# Patient Record
Sex: FEMALE | Race: WHITE | NOT HISPANIC OR LATINO | Employment: UNEMPLOYED | URBAN - METROPOLITAN AREA
[De-identification: names, ages, dates, MRNs, and addresses within clinical notes are randomized per-mention and may not be internally consistent; named-entity substitution may affect disease eponyms.]

---

## 2019-12-30 ENCOUNTER — HOSPITAL ENCOUNTER (EMERGENCY)
Facility: MEDICAL CENTER | Age: 24
End: 2019-12-31
Attending: EMERGENCY MEDICINE

## 2019-12-30 DIAGNOSIS — J02.9 VIRAL PHARYNGITIS: ICD-10-CM

## 2019-12-30 PROCEDURE — A9270 NON-COVERED ITEM OR SERVICE: HCPCS | Performed by: EMERGENCY MEDICINE

## 2019-12-30 PROCEDURE — 99284 EMERGENCY DEPT VISIT MOD MDM: CPT

## 2019-12-30 PROCEDURE — 700102 HCHG RX REV CODE 250 W/ 637 OVERRIDE(OP): Performed by: EMERGENCY MEDICINE

## 2019-12-30 PROCEDURE — 87651 STREP A DNA AMP PROBE: CPT

## 2019-12-30 RX ORDER — IBUPROFEN 600 MG/1
600 TABLET ORAL ONCE
Status: COMPLETED | OUTPATIENT
Start: 2019-12-30 | End: 2019-12-30

## 2019-12-30 RX ADMIN — IBUPROFEN 600 MG: 600 TABLET ORAL at 22:51

## 2019-12-30 SDOH — HEALTH STABILITY: MENTAL HEALTH: HOW OFTEN DO YOU HAVE A DRINK CONTAINING ALCOHOL?: MONTHLY OR LESS

## 2019-12-30 ASSESSMENT — LIFESTYLE VARIABLES
TOTAL SCORE: 0
HAVE PEOPLE ANNOYED YOU BY CRITICIZING YOUR DRINKING: NO
TOTAL SCORE: 0
HAVE YOU EVER FELT YOU SHOULD CUT DOWN ON YOUR DRINKING: NO
EVER FELT BAD OR GUILTY ABOUT YOUR DRINKING: NO
CONSUMPTION TOTAL: INCOMPLETE
DO YOU DRINK ALCOHOL: YES
EVER HAD A DRINK FIRST THING IN THE MORNING TO STEADY YOUR NERVES TO GET RID OF A HANGOVER: NO
TOTAL SCORE: 0

## 2019-12-31 VITALS
WEIGHT: 293 LBS | HEIGHT: 67 IN | RESPIRATION RATE: 18 BRPM | OXYGEN SATURATION: 95 % | TEMPERATURE: 97.3 F | BODY MASS INDEX: 45.99 KG/M2 | HEART RATE: 72 BPM | SYSTOLIC BLOOD PRESSURE: 145 MMHG | DIASTOLIC BLOOD PRESSURE: 89 MMHG

## 2019-12-31 LAB — S PYO DNA SPEC NAA+PROBE: NOT DETECTED

## 2019-12-31 NOTE — ED PROVIDER NOTES
"ED Provider Note    Scribed for Too Hebert M.D. by Mer Smalls. 12/30/2019, 10:24 PM.    Primary care provider: No primary care provider  Means of arrival: walkin  History obtained from: patient  History limited by: none    CHIEF COMPLAINT  Chief Complaint   Patient presents with   • Sore Throat     x 4 days       HPI  Charlene Bernardo is a 24 y.o. female who presents to the Emergency Department for sore throat onset five days ago. This has been worsening since then, now with exudate. Patient remarks that there are white patches to back of throat. Also reports difficulty catching breath and headache. No fever or abdominal pain. Still has tonsils. No known drug allergies    REVIEW OF SYSTEMS  Pertinent positives include sore throat, exudates, difficulty catching breath, headache.   Pertinent negatives include fever, abdominal pain.     PAST MEDICAL HISTORY  History reviewed. No pertinent medical history.    SURGICAL HISTORY  History reviewed. No pertinent surgical history.    SOCIAL HISTORY  Social History     Tobacco Use   • Smoking status: Never Smoker   • Smokeless tobacco: Never Used   Substance Use Topics   • Alcohol use: Yes     Frequency: Monthly or less   • Drug use: Yes     Comment: THC.      Social History     Substance and Sexual Activity   Drug Use Yes    Comment: THC.       FAMILY HISTORY  History reviewed. No pertinent family history.    CURRENT MEDICATIONS  No known drug allergies    ALLERGIES  No Known Allergies    PHYSICAL EXAM  VITAL SIGNS: BP (!) 176/128   Pulse (!) 118   Temp 36.3 °C (97.3 °F) (Oral)   Resp 20   Ht 1.702 m (5' 7\")   Wt (!) 144 kg (317 lb 7.4 oz)   LMP 12/08/2019   SpO2 97%   BMI 49.72 kg/m²     Constitutional: Well developed, Well nourished, no distress.   HENT: Normocephalic, Atraumatic, normal TM's bilaterally. Oropharynx moist, enlarged tonsils with erythema and exudates, not touching uvula, mild lymphadenopathy.   Eyes: Conjunctiva normal, No " discharge.   Neck: Supple, No stridor, mild lymphadenopathy.   Cardiovascular: Normal heart rate, Normal rhythm, No murmurs, equal pulses.   Pulmonary: Normal breath sounds, No respiratory distress, No wheezing, No rales, No rhonchi.  Chest: No chest wall tenderness or deformity.   Abdomen:Soft, No tenderness, No masses, no rebound, no guarding. No splenomegaly, no left upper quadrant tenderness   Musculoskeletal: No major deformities noted, No tenderness.   Skin: Warm, Dry, No erythema, No rash.   Neurologic: Alert & oriented x 3, Normal motor function,  No focal deficits noted.   Psychiatric: Affect normal, Judgment normal, Mood normal.     DIAGNOSTIC STUDIES/PROCEDURES  LABS  Results for orders placed or performed during the hospital encounter of 12/30/19   Group A Strep by PCR   Result Value Ref Range    Group A Strep by PCR Not Detected Not Detected     All labs reviewed by me.    COURSE & MEDICAL DECISION MAKING  Pertinent Labs & Imaging studies reviewed. (See chart for details)    10:24 PM - Patient seen and examined at bedside. Patient will be treated with 600mg motrin. Ordered group A strep to evaluate her symptoms.     12:45 AM Patient reevaluated at bedside. Patient feeling improved, is resting comfortably, and is in no acute distress. Discussed resulted studies. Discussed plan for discharge; I advised the patient to follow-up with primary care physician, and to return to the [facility] ED with any new or worsening symptoms, including fever. Patient was given the opportunity for questions. I addressed all questions or concerns at this time and they verbalize agreement to the plan of care.    Medical Decision Making: At this point time it appears the patient likely has a viral pharyngitis.  Strep was negative.  Patient does not show any signs of respiratory compromise or peritonsillar abscess.  Discussed symptomatic treatment with the patient.  Patient will be discharged home.    The patient will return for  new or worsening symptoms and is stable at the time of discharge.    The patient is referred to a primary physician for blood pressure management, diabetic screening, and for all other preventative health concerns.    DISPOSITION:  Patient will be discharged home in stable condition.    FOLLOW UP:  [facility] Emergency Dept  [address] [id number] [phone]        OUTPATIENT MEDICATIONS:  There are no discharge medications for this patient.      FINAL IMPRESSION  1. Viral pharyngitis       Mer TENORIO (Savage), am scribing for, and in the presence of, Too Hebert M.D.    Electronically signed by: Mer Smalls (Savage), 12/30/2019    Too TENORIO M.D. personally performed the services described in this documentation, as scribed by Mer Smalls in my presence, and it is both accurate and complete.    The note accurately reflects work and decisions made by me.  Too Hebert  12/31/2019  2:57 AM     E

## 2019-12-31 NOTE — ED NOTES
Patient given discharge instruction and will follow up with PCP. Understands when to return to ED.

## 2019-12-31 NOTE — ED TRIAGE NOTES
"Chief Complaint   Patient presents with   • Sore Throat     x 4 days     Pt reports sore throat as above with \"white patches\" in her throat. Denies fever/chills. Pt educated on triage process and placed back in lobby, pt encourage to alert staff with changes in condition.        BP (!) 176/128   Pulse (!) 118   Temp 36.3 °C (97.3 °F) (Oral)   Resp 20   Ht 1.702 m (5' 7\")   Wt (!) 144 kg (317 lb 7.4 oz)   LMP 12/08/2019   SpO2 97%   BMI 49.72 kg/m²     "

## 2019-12-31 NOTE — DISCHARGE INSTRUCTIONS
Return if you have one tonsil pushing the uvula to the side, unable to swallow, difficulty breathing or fever that will not go down with tylenol or Ibuprofen.     Please follow up with a primary physician for blood pressure management, diabetic screening, and all other preventive health concerns.

## 2022-07-22 ENCOUNTER — HOSPITAL ENCOUNTER (EMERGENCY)
Facility: MEDICAL CENTER | Age: 27
End: 2022-07-22
Attending: EMERGENCY MEDICINE

## 2022-07-22 VITALS
TEMPERATURE: 98.5 F | BODY MASS INDEX: 45.99 KG/M2 | HEART RATE: 89 BPM | OXYGEN SATURATION: 98 % | HEIGHT: 67 IN | RESPIRATION RATE: 16 BRPM | DIASTOLIC BLOOD PRESSURE: 95 MMHG | SYSTOLIC BLOOD PRESSURE: 170 MMHG | WEIGHT: 293 LBS

## 2022-07-22 DIAGNOSIS — K08.89 PAIN, DENTAL: ICD-10-CM

## 2022-07-22 PROCEDURE — 700111 HCHG RX REV CODE 636 W/ 250 OVERRIDE (IP): Performed by: EMERGENCY MEDICINE

## 2022-07-22 PROCEDURE — 99283 EMERGENCY DEPT VISIT LOW MDM: CPT

## 2022-07-22 PROCEDURE — 64400 NJX AA&/STRD TRIGEMINAL NRV: CPT

## 2022-07-22 RX ORDER — BUPIVACAINE HYDROCHLORIDE 2.5 MG/ML
5 INJECTION, SOLUTION EPIDURAL; INFILTRATION; INTRACAUDAL ONCE
Status: COMPLETED | OUTPATIENT
Start: 2022-07-22 | End: 2022-07-22

## 2022-07-22 RX ORDER — AMOXICILLIN 500 MG/1
500 CAPSULE ORAL 3 TIMES DAILY
Qty: 21 CAPSULE | Refills: 0 | Status: SHIPPED | OUTPATIENT
Start: 2022-07-22 | End: 2022-07-29

## 2022-07-22 RX ORDER — OXYCODONE HYDROCHLORIDE 5 MG/1
5 TABLET ORAL EVERY 4 HOURS PRN
Qty: 8 TABLET | Refills: 0 | Status: SHIPPED | OUTPATIENT
Start: 2022-07-22 | End: 2022-07-25

## 2022-07-22 RX ADMIN — LIDOCAINE HYDROCHLORIDE 5 ML: 10 INJECTION, SOLUTION EPIDURAL; INFILTRATION; INTRACAUDAL; PERINEURAL at 13:45

## 2022-07-22 RX ADMIN — BUPIVACAINE HYDROCHLORIDE 5 ML: 2.5 INJECTION, SOLUTION EPIDURAL; INFILTRATION; INTRACAUDAL; PERINEURAL at 13:45

## 2022-07-22 NOTE — ED TRIAGE NOTES
"Chief Complaint   Patient presents with   • Tooth Ache     X5 days on the lower L side. Pt states the pain radiates to her top L jaw. Pt cannot see her dentist for 1 month     Pt walk in for above. Pt has been taking tylenol and ibuprofen \"every hour\". Pt denies any fevers or chills. Pt aox4, GCS 15.         BP (!) 178/107   Pulse (!) 101   Temp 36.3 °C (97.3 °F) (Temporal)   Resp 16   Ht 1.702 m (5' 7\")   Wt (!) 148 kg (326 lb 11.6 oz)   LMP 06/28/2022   SpO2 95%   BMI 51.17 kg/m²     "

## 2022-07-22 NOTE — DISCHARGE INSTRUCTIONS
You were seen in the Emergency Department for dental pain.    Please use 1,000mg of tylenol or 600mg of ibuprofen every 6 hours as needed for pain.    Please follow up with your primary care physician and dentist as soon as possible.    Return to the Emergency Department with fevers, facial swelling, trouble breathing, or other concerns.

## 2022-07-22 NOTE — ED PROVIDER NOTES
"ED Provider Note    Scribed for Joelle Cagle M.D. by Cherie Serna. 7/22/2022  12:53 PM    Means of arrival: Walk in  History obtained from: patient  History limited by: none      CHIEF COMPLAINT  Chief Complaint   Patient presents with   • Tooth Ache     X5 days on the lower L side. Pt states the pain radiates to her top L jaw. Pt cannot see her dentist for 1 month       HPI  Charlene Bernardo is a 27 y.o. female who presents to the Emergency Department for left lower tooth pain onset 5 days ago. Patient has a history of dental problems but state this is worse than her normal tooth aches. She has radiation of pain into her left ear and left side of her neck with associated headaches. Denies facial swelling or fevers. Patient has been taking ibuprofen and tylenol without improvements of her pain. She has a follow up scheduled with her dentist however this is not until next month.    REVIEW OF SYSTEMS  Pertinent positive include left lower dental pain, headache, left ear pain, left neck pain. Pertinent negative include facial swelling or fevers. All other systems reviewed and are negative.      PAST MEDICAL HISTORY   None pertinent    SOCIAL HISTORY  Social History     Tobacco Use   • Smoking status: Never Smoker   • Smokeless tobacco: Never Used   Vaping Use   • Vaping Use: Every day   Substance and Sexual Activity   • Alcohol use: Yes   • Drug use: Yes     Comment: THC.     SURGICAL HISTORY  patient denies any surgical history    CURRENT MEDICATIONS  Home Medications     Reviewed by Trisha Wooten R.N. (Registered Nurse) on 07/22/22 at 1237  Med List Status: Complete   Medication Last Dose Status        Patient Jorge Taking any Medications                       ALLERGIES  No Known Allergies    PHYSICAL EXAM   VITAL SIGNS: BP (!) 178/107   Pulse (!) 101   Temp 36.3 °C (97.3 °F) (Temporal)   Resp 16   Ht 1.702 m (5' 7\")   Wt (!) 148 kg (326 lb 11.6 oz)   LMP 06/28/2022   SpO2 95%   BMI 51.17 kg/m²  " "  Constitutional: Nontoxic appearing, alert in no apparent distress.  HENT: Normocephalic, Atraumatic. No trismus, no submandibular fullness, no drainable abscess, tenderness over left posterior molar/ Bilateral external ears normal. Nose normal.  Moist mucous membranes.  Oropharynx clear.  Eyes: Pupils are equal and reactive. Conjunctiva normal.   Neck: Supple, full range of motion  Musculoskeletal: Atraumatic. No obvious deformities noted.  No lower extremity edema.  Skin: Warm, Dry.  No erythema, No rash.   Neurologic: Alert and oriented x3. Moving all extremities spontaneously without focal deficits.  Psychiatric: Affect normal, Mood normal, Appears appropriate and not intoxicated.      PROCEDURE    Inferior Alveolar nerve block Procedure Note     Indication: Dental pain, anesthesia for incision and drainage    Procedure: The patient was positioned appropriately and anatomical landmarks were identified. Approximately 3cc of half 1% lidocaine and half 0.25% bupivacaine were injected to the area.     Complications: None.  Appropriate anesthesthia was achieved.      ED COURSE  Vitals:    07/22/22 1216 07/22/22 1233 07/22/22 1318   BP: (!) 178/107  (!) 170/95   Pulse: (!) 101  89   Resp: 16  16   Temp: 36.3 °C (97.3 °F)  36.9 °C (98.5 °F)   TempSrc: Temporal  Temporal   SpO2: 95%  98%   Weight:  (!) 148 kg (326 lb 11.6 oz)    Height:  1.702 m (5' 7\")        Medications administered:  Medications   lidocaine (XYLOCAINE) 1 % injection 5 mL (5 mL Other Given 7/22/22 1345)   bupivacaine 0.25% (SENSORCAINE-MARCAINE) pf injection 5 mL (5 mL Injection Given 7/22/22 1345)       12:53 PM Patient seen and examined at bedside. The patient presents with left sided dental pain. Patient does not have an abscess at this time. She will be treated with antibiotics for infection. Recommended 3 ibuprofnen and 2 extra strength tylenol for pain and prescribed Roxicodone for breakthrough pain. Prescribed amoxicillin.       1:25 PM dental " block procedure preformed at this time (see procedure note above for details). Discussed return precautions. Patient will be discharged at this time. She verbalizes agreement with discharge and plan of care.    MEDICAL DECISION MAKING  Young healthy patient presents with lower molar dental pain that has been ongoing for a few days.  She is afebrile, hypertensive on arrival with otherwise reassuring vital signs.  There is no signs of Abran's angina or dental abscess on exam.  Dental block was performed with significant improvement of pain and patient will be discharged home on antibiotics in case of developing infection until she is able to see her dentist.        DISPOSITION:  Patient will be discharged home in stable condition.    FOLLOW UP:  Your Dentist    Schedule an appointment as soon as possible for a visit       [facility], Emergency Dept  [address] [id number] [phone]    If symptoms worsen      OUTPATIENT MEDICATIONS:  Discharge Medication List as of 7/22/2022  1:18 PM      START taking these medications    Details   oxyCODONE immediate-release (ROXICODONE) 5 MG Tab Take 1 Tablet by mouth every four hours as needed for Severe Pain for up to 3 days., Disp-8 Tablet, R-0, Normal      amoxicillin (AMOXIL) 500 MG Cap Take 1 Capsule by mouth 3 times a day for 7 days., Disp-21 Capsule, R-0, Normal             IMPRESSION  (K08.89) Pain, dental        In prescribing controlled substances to this patient, I certify that I have obtained and reviewed the medical history of Charlene Bernardo. I have also made a good cierra effort to obtain applicable records from other providers who have treated the patient and records did not demonstrate any increased risk of substance abuse that would prevent me from prescribing controlled substances.     I have conducted a physical exam and documented it. I have reviewed Ms. Bernardo’s prescription history as maintained by the [place]  Prescription Monitoring Program.     I have assessed the patient’s risk for abuse, dependency, and addiction using the validated Opioid Risk Tool available at https://www.mdcalc.com/ulgtax-zxkk-jixe-ort-narcotic-abuse.     Given the above, I believe the benefits of controlled substance therapy outweigh the risks. The reasons for prescribing controlled substances include non-narcotic, oral analgesic alternatives have been inadequate for pain control. Accordingly, I have discussed the risk and benefits, treatment plan, and alternative therapies with the patient.      Cherie TENORIO (Savage), am scribing for, and in the presence of, Joelle Cagle M.D..    Electronically signed by: Cherie Serna (Savage), 7/22/2022    Joelle TENORIO M.D. personally performed the services described in this documentation, as scribed by Cherie Serna in my presence, and it is both accurate and complete.    The note accurately reflects work and decisions made by me.  Joelle Cagle M.D.  7/23/2022  11:43 AM

## 2022-07-22 NOTE — ED NOTES
Pt self ambulated to treatment area with out difficulty. Pt is sitting upright in chair with no needs at this time. Chart up for ERP.

## 2022-07-22 NOTE — ED NOTES
Pt states she feels numb and no longer has any pain.     Pt sitting upright in bed in no obvious distress at this time. Discharge information and instructions given/discussed with Pt. Pt acknowledged information with no questions at this time. Pt self ambulated to  [employer] without difficulty for her ride.

## 2022-08-21 ENCOUNTER — HOSPITAL ENCOUNTER (EMERGENCY)
Facility: MEDICAL CENTER | Age: 27
End: 2022-08-21
Attending: EMERGENCY MEDICINE

## 2022-08-21 VITALS
OXYGEN SATURATION: 98 % | HEART RATE: 68 BPM | DIASTOLIC BLOOD PRESSURE: 94 MMHG | TEMPERATURE: 97.8 F | HEIGHT: 67 IN | RESPIRATION RATE: 16 BRPM | SYSTOLIC BLOOD PRESSURE: 171 MMHG | BODY MASS INDEX: 45.99 KG/M2 | WEIGHT: 293 LBS

## 2022-08-21 DIAGNOSIS — K08.89 PAIN, DENTAL: ICD-10-CM

## 2022-08-21 PROCEDURE — 99282 EMERGENCY DEPT VISIT SF MDM: CPT

## 2022-08-21 PROCEDURE — 700111 HCHG RX REV CODE 636 W/ 250 OVERRIDE (IP): Performed by: EMERGENCY MEDICINE

## 2022-08-21 PROCEDURE — 64400 NJX AA&/STRD TRIGEMINAL NRV: CPT

## 2022-08-21 RX ORDER — BUPIVACAINE HYDROCHLORIDE 2.5 MG/ML
10 INJECTION, SOLUTION EPIDURAL; INFILTRATION; INTRACAUDAL ONCE
Status: COMPLETED | OUTPATIENT
Start: 2022-08-21 | End: 2022-08-21

## 2022-08-21 RX ORDER — PENICILLIN V POTASSIUM 500 MG/1
500 TABLET ORAL EVERY 6 HOURS
Qty: 28 TABLET | Refills: 0 | Status: SHIPPED | OUTPATIENT
Start: 2022-08-21 | End: 2022-08-28

## 2022-08-21 RX ADMIN — BUPIVACAINE HYDROCHLORIDE 10 ML: 2.5 INJECTION, SOLUTION EPIDURAL; INFILTRATION; INTRACAUDAL; PERINEURAL at 11:45

## 2022-08-21 NOTE — ED NOTES
Patient discharged home in stable condition  AVS provided with recommended follow up and home care instructions and education information  Prescription for penicillin provided at this time  Patient verbalized understanding  Ambulatory at time of discharge

## 2022-08-21 NOTE — ED PROVIDER NOTES
"ED Provider Note    CHIEF COMPLAINT  Chief Complaint   Patient presents with    Dental Pain       HPI  Charlene Bernardo is a 27 y.o. female who presents for evaluation of dental pain.  The patient presents with 3 to 4 days of progressive pain to the left upper and left lower dentition area.  The patient was seen 1 month ago and at that time patient was treated with an inferior alveolar nerve block along with antibiotics.  The patient states that she lost her ID and is waiting for that to be seen by dental services.  The patient's had no associated: Fever, URI symptoms, cardiorespiratory symptoms.    REVIEW OF SYSTEMS  See HPI for further details.  She denies major health problems such as hypertension, diabetes, seizures, cardiopulmonary disorders;    PAST MEDICAL HISTORY  History reviewed. No pertinent past medical history.    FAMILY HISTORY  No family history on file.    SOCIAL HISTORY  Resides locally    SURGICAL HISTORY  History reviewed. No pertinent surgical history.    CURRENT MEDICATIONS  See nurses notes    ALLERGIES  No Known Allergies    PHYSICAL EXAM  VITAL SIGNS: BP (!) 165/99   Pulse 68   Temp 36.2 °C (97.2 °F) (Temporal)   Resp 17   Ht 1.702 m (5' 7\")   Wt (!) 149 kg (327 lb 13.2 oz)   SpO2 97%   BMI 51.34 kg/m²      Constitutional: 27-year-old female, BMI 51, oriented x3  HENT: Normocephalic, Atraumatic, Nares:Clear, Oropharynx: moist, well hydrated, posterior pharynx:clear; multiple dental caries identified throughout the upper and lower dentition on both sides; the patient appears to have most tenderness around teeth number 12 and 13 with caries and enamel breakdown identified slight gingival swelling but no fluctuant areas identified; there is mild tenderness over the lower posterior premolar molar regions on the left but no localized swelling, no sublingular edema, no trismus  Eyes: PERRL, EOMI, Conjunctiva normal, No discharge.   Neck: Normal range of motion, No tenderness, Supple, No " stridor.   Lymphatic: No lymphadenopathy noted.   Cardiovascular: Regular rate and rhythm without mumurs, gallups, rubs   Thorax & Lungs: Normal Equal breath sounds, No respiratory distress, No wheezing, no stridor, no rales. No chest tenderness.   Skin: Good skin turgor, pink, warm, dry. No rashes, petechiae, purpura. Normal capillary refill.   Neurologic: Alert & oriented x 3,  No gross focal deficits noted.   Psychiatric: Affect normal, Judgment normal, Mood normal.     COURSE & MEDICAL DECISION MAKING  Pertinent Labs & Imaging studies reviewed. (See chart for details)  Procedure note: Visualize landmarks in the antral area for treatment with inferior alveolar nerve block.  A 1-1/4 27-gauge needle was utilized.  The medial side of the left mandible was identified with gentle tapping over the bone.  I then infiltrated 4 cc of 0.5% bupivacaine.  No complications noted.  The patient states she did get some pain relief on the bottom of her dentition but still has upper dentition pain.    Discussion: At this time, the patient has dental caries with acute and chronic dental pain.  Patient was treated with an inferior alveolar nerve block per her request,  As this helped previously.  Patient was placed on Pen-Vee K.  I have recommended she use Tylenol and/or ibuprofen for pain.  I will not prescribe any narcotics at this time as she has no ID.  Patient is to follow-up with dental services for more definitive evaluation and treatment.    FINAL IMPRESSION  1. Pain, dental           PLAN  1.  Appropriate discharge instructions given  2.  Pen-Vee K 500 mg #28  3.  Follow-up with dental services    Electronically signed by: Guy G Gansert, M.D., 8/21/2022 11:18 AM

## 2022-08-21 NOTE — ED TRIAGE NOTES
Pt amb to triage c/o dental pain to L side upper and lower teeth >3d. Pt states she has appt to see dentist next week.